# Patient Record
Sex: MALE | Race: BLACK OR AFRICAN AMERICAN | NOT HISPANIC OR LATINO | Employment: FULL TIME | URBAN - METROPOLITAN AREA
[De-identification: names, ages, dates, MRNs, and addresses within clinical notes are randomized per-mention and may not be internally consistent; named-entity substitution may affect disease eponyms.]

---

## 2017-02-08 ENCOUNTER — THERAPY VISIT (OUTPATIENT)
Dept: PHYSICAL THERAPY | Facility: CLINIC | Age: 37
End: 2017-02-08
Payer: COMMERCIAL

## 2017-02-08 DIAGNOSIS — M54.2 CERVICALGIA: Primary | ICD-10-CM

## 2017-02-08 PROCEDURE — 97140 MANUAL THERAPY 1/> REGIONS: CPT | Mod: GP | Performed by: PHYSICAL THERAPIST

## 2017-02-08 PROCEDURE — 97035 APP MDLTY 1+ULTRASOUND EA 15: CPT | Mod: GP | Performed by: PHYSICAL THERAPIST

## 2017-02-08 PROCEDURE — 97110 THERAPEUTIC EXERCISES: CPT | Mod: GP | Performed by: PHYSICAL THERAPIST

## 2017-02-08 NOTE — PROGRESS NOTES
Subjective:    HPI                    Objective:    System    Physical Exam    General     ROS    Assessment/Plan:      PROGRESS  REPORT    Progress reporting period is from 10/18/16 to 10/28/16 (4 visits), returned to PT today 2/8/17 to resume.       SUBJECTIVE  Subjective changes noted by patient:  Patient reports that he was feeling good after initial 4 visits of PT, but that symptoms returned as of late.  Still L sided neck near C7 and into L upper trap.  Denies shoulder or radicular L arm/UE pain.  He did see his MD and was referred back to PT.  He has continued some of his HEP and felt that US was helpful before, would like to have that again.     Current pain level is: 4/10.     Previous pain level was: 6/10.   Changes in function:  Will re establish goals  Adverse reaction to treatment or activity: had been doing well, but symptoms returned without specific activity.      OBJECTIVE  Changes noted in objective findings:  Yes,   CERVICAL:  Neurological:    Motor Deficit:  Myotomes L R   C4 (shoulder elevation) 5 5   C5 (shoulder abduction) 5 5   C6 (elbow flexion) 5 NT due to elbow deformity   C7 (elbow extension) 5    C8 (thumb extension) 5    T1 (finger add/abd) 5     Strength (lb) WNL      Sensory Deficit, Reflexes, Dural Signs: intact light touch screen L UE dermatome    AROM: (Major, Moderate, Minimal or Nil loss)  Movement Loss Ac Mod Min Nil Pain   Protrusion    X No effect   Flexion    X No effect   Retraction    X Trace pain posterior   Extension   X  No effect   Left Rotation   X  Pain L neck/upper trap   Right Rotation    X No effect   Left Side Bending    X No effect   Right Side bending    X Trace pain L neck/upper trap     Other Tests: palpation is with tightness/tenderness L lateral to C7 and in L upper trap.     ASSESSMENT/PLAN  Updated problem list and treatment plan: Diagnosis 1:  L sided neck pain    Pain -  hot/cold therapy, US, manual therapy and directional preference  exercise  Decreased ROM/flexibility - manual therapy and therapeutic exercise  Decreased strength - therapeutic exercise and therapeutic activities  Decreased proprioception - neuro re-education and therapeutic activities  Decreased function - therapeutic activities  Impaired posture - neuro re-education  STG/LTGs have been met or progress has been made towards goals:  Will re establish goals as per goal flow sheet.  Assessment of Progress: The patient's condition has potential to improve.  Self Management Plans:  Patient has been instructed in a home treatment program.  I have re-evaluated this patient and find that the nature, scope, duration and intensity of the therapy is appropriate for the medical condition of the patient.  Mi continues to require the following intervention to meet STG and LTG's:  PT    Recommendations:  This patient would benefit from continued therapy.     Frequency:  1 X week, once daily  Duration:  for 4 weeks        Please refer to the daily flowsheet for treatment today, total treatment time and time spent performing 1:1 timed codes.

## 2017-02-14 ENCOUNTER — THERAPY VISIT (OUTPATIENT)
Dept: PHYSICAL THERAPY | Facility: CLINIC | Age: 37
End: 2017-02-14
Payer: COMMERCIAL

## 2017-02-14 DIAGNOSIS — M54.2 CERVICALGIA: ICD-10-CM

## 2017-02-14 PROCEDURE — 97110 THERAPEUTIC EXERCISES: CPT | Mod: GP | Performed by: PHYSICAL THERAPIST

## 2017-02-14 PROCEDURE — 97140 MANUAL THERAPY 1/> REGIONS: CPT | Mod: GP | Performed by: PHYSICAL THERAPIST

## 2017-02-14 PROCEDURE — 97035 APP MDLTY 1+ULTRASOUND EA 15: CPT | Mod: GP | Performed by: PHYSICAL THERAPIST

## 2017-02-14 NOTE — MR AVS SNAPSHOT
"              After Visit Summary   2017    Mi Saldivar    MRN: [id number]           Patient Information     Date Of Birth          1980        Visit Information        Provider Department      2017 11:10 AM Estiven Fitzpatrick PT [facility] Physical Therapy        Today's Diagnoses     Cervicalgia           Follow-ups after your visit        Who to contact     If you have questions or need follow up information about today's clinic visit or your schedule please contact [facility] PHYSICAL [facility] directly at [phone].  Normal or non-critical lab and imaging results will be communicated to you by [employer], letter or phone within 4 business days after the clinic has received the results. If you do not hear from us within 7 days, please contact the clinic through ArcSightt or phone. If you have a critical or abnormal lab result, we will notify you by phone as soon as possible.  Submit refill requests through Myfacepage or call your pharmacy and they will forward the refill request to us. Please allow 3 business days for your refill to be completed.          Additional Information About Your Visit        MyChart Information     Myfacepage lets you send messages to your doctor, view your test results, renew your prescriptions, schedule appointments and more. To sign up, go to [email or web address] . Click on \"Log in\" on the left side of the screen, which will take you to the Welcome page. Then click on \"Sign up Now\" on the right side of the page.     You will be asked to enter the access code listed below, as well as some personal information. Please follow the directions to create your username and password.     Your access code is: [email or web address]  Expires: 5/15/2017 11:55 AM     Your access code will  in 90 days. If you need help or a new code, please call your [place] clinic or [phone].        Care EveryWhere ID     This is your " Care EveryWhere ID. This could be used by other organizations to access your [place] medical records  [id number]         Blood Pressure from Last 3 Encounters:   02/19/16 153/87    Weight from Last 3 Encounters:   02/19/16 90.7 kg (200 lb)              We Performed the Following     JAMES PROGRESS NOTES REPORT     MANUAL THER TECH,1+REGIONS,EA 15 MIN     THERAPEUTIC EXERCISES     ULTRASOUND THERAPY        Primary Care Provider Office Phone # Fax #    Zenon Pettit [phone]       [facility] [address] [address]        Thank you!     Thank you for choosing [facility] PHYSICAL THERAPY  for your care. Our goal is always to provide you with excellent care. Hearing back from our patients is one way we can continue to improve our services. Please take a few minutes to complete the written survey that you may receive in the mail after your visit with us. Thank you!             Your Updated Medication List - Protect others around you: Learn how to safely use, store and throw away your medicines at www.disposemymeds.org.          This list is accurate as of: 2/14/17 11:55 AM.  Always use your most recent med list.                   Brand Name Dispense Instructions for use    ciprofloxacin 500 MG tablet    CIPRO    28 tablet    Take 1 tablet (500 mg) by mouth 2 times daily       naproxen 500 MG tablet    NAPROSYN    60 tablet    Take 1 tablet (500 mg) by mouth 2 times daily as needed for moderate pain       OMEPRAZOLE PO          vitamin D 2000 UNITS tablet     100 tablet    Take 2,000 Units by mouth daily

## 2017-02-14 NOTE — PROGRESS NOTES
Subjective:    HPI                    Objective:    System    Physical Exam    General     ROS    Assessment/Plan:      PROGRESS  REPORT    Progress reporting period is from 10/18/16 to 2/14/17 (6 visits).       SUBJECTIVE  Subjective changes noted by patient:  Patient was in for 4 visits initially and was doing better on his own for a couple of months, he returned last week after seeing his MD.  He had some increased symptoms and had been ordered for 2 more PT visits.  He is now at 6/6 and plans to re check with his MD to see if he should continue PT or if there is another recommendation.  As of today, patient reports that he is feeling a little better.  He notes that pain level decreased to 3/10.  He notes that he's  if he touches the painful area in the L neck/upper trap.  He does feel that his exercises are helping and that US and manaul therapy helped in PT last visit.     Current pain level is: 3/10.     Previous pain level was: 6/10.   Changes in function:  Yes (See Goal flowsheet attached for changes in current functional level)  Adverse reaction to treatment or activity: None    OBJECTIVE  Changes noted in objective findings:  Yes,   Objective: Cervical AROM: min loss of extension and L rotation, both with pain L neck/upper trap area.  Remaining motions are not painful today.      (PN done on 2/8/17--can see that note for further recent details)     ASSESSMENT/PLAN  Updated problem list and treatment plan: Diagnosis 1:  Cervicalgia with pain L upper trap    Pain -  hot/cold therapy, US, manual therapy, directional preference exercise and home program  Decreased ROM/flexibility - manual therapy and therapeutic exercise  Decreased strength - therapeutic exercise and therapeutic activities  Decreased proprioception - neuro re-education and therapeutic activities  Decreased function - therapeutic activities  Impaired posture - neuro re-education  STG/LTGs have been met or progress has been made  towards goals:  Yes (See Goal flow sheet completed today.)  Assessment of Progress: The patient's condition has potential to improve.  Self Management Plans:  Patient has been instructed in a home treatment program.  I have re-evaluated this patient and find that the nature, scope, duration and intensity of the therapy is appropriate for the medical condition of the patient.  Mi continues to require the following intervention to meet STG and LTG's:  PT    Recommendations:  Will re check with his MD to see if there is any further recommendation for PT or other care.     Please refer to the daily flowsheet for treatment today, total treatment time and time spent performing 1:1 timed codes.

## 2017-02-14 NOTE — LETTER
[facility] ATHLETIC [facility] PHYSICAL THERAPY  [address]  [facility] [address]  [phone]    February 15, 2017    Re: Mi Saldivar   :   1980  MRN:  [id number]   REFERRING PHYSICIAN:   Rickie Rowland    [facility] ATHLETIC [facility] PHYSICAL THERAPY    Date of Initial Evaluation:  10/18/2016  Visits:  Rxs Used: 6  Reason for Referral:  Cervicalgia    PROGRESS  REPORT  Progress reporting period is from 10/18/16 to 17 (6 visits).       SUBJECTIVE  Subjective changes noted by patient:  Patient was in for 4 visits initially and was doing better on his own for a couple of months, he returned last week after seeing his MD.  He had some increased symptoms and had been ordered for 2 more PT visits.  He is now at 6/6 and plans to re check with his MD to see if he should continue PT or if there is another recommendation.  As of today, patient reports that he is feeling a little better.  He notes that pain level decreased to 3/10.  He notes that he's  if he touches the painful area in the L neck/upper trap.  He does feel that his exercises are helping and that US and manaul therapy helped in PT last visit.     Current pain level is: 3/10.     Previous pain level was: 6/10.   Changes in function:  Yes (See Goal flowsheet attached for changes in current functional level)  Adverse reaction to treatment or activity: None    OBJECTIVE  Changes noted in objective findings:  Yes,   Objective: Cervical AROM: min loss of extension and L rotation, both with pain L neck/upper trap area.  Remaining motions are not painful today.    (PN done on 17--can see that note for further recent details)     ASSESSMENT/PLAN  Updated problem list and treatment plan: Diagnosis 1:  Cervicalgia with pain L upper trap    Pain -  hot/cold therapy, US, manual therapy, directional preference exercise and home program  Decreased ROM/flexibility - manual therapy and  therapeutic exercise  Decreased strength - therapeutic exercise and therapeutic activities  Decreased proprioception - neuro re-education and therapeutic activities  Re: Mi Saldivar   :   1980    Decreased function - therapeutic activities  Impaired posture - neuro re-education  STG/LTGs have been met or progress has been made towards goals:  Yes (See Goal flow sheet completed today.)  Assessment of Progress: The patient's condition has potential to improve.  Self Management Plans:  Patient has been instructed in a home treatment program.  I have re-evaluated this patient and find that the nature, scope, duration and intensity of the therapy is appropriate for the medical condition of the patient.  Mi continues to require the following intervention to meet STG and LTG's:  PT    Recommendations:  Will re check with his MD to see if there is any further recommendation for PT or other care.     Thank you for your referral.    INQUIRIES  Therapist: Jose Fitzpatrick DPT  [facility]  [address] [address]  Phone: [phone]  Fax: [phone]

## 2017-02-16 ENCOUNTER — OFFICE VISIT (OUTPATIENT)
Dept: FAMILY MEDICINE | Facility: CLINIC | Age: 37
End: 2017-02-16

## 2017-02-16 VITALS
BODY MASS INDEX: 31.32 KG/M2 | TEMPERATURE: 98.2 F | DIASTOLIC BLOOD PRESSURE: 90 MMHG | SYSTOLIC BLOOD PRESSURE: 143 MMHG | RESPIRATION RATE: 16 BRPM | WEIGHT: 200 LBS | OXYGEN SATURATION: 97 % | HEART RATE: 68 BPM

## 2017-02-16 DIAGNOSIS — M72.2 PLANTAR FASCIITIS: Primary | ICD-10-CM

## 2017-02-16 DIAGNOSIS — A04.8 H. PYLORI INFECTION: ICD-10-CM

## 2017-02-16 DIAGNOSIS — R03.0 ELEVATED BLOOD PRESSURE READING WITHOUT DIAGNOSIS OF HYPERTENSION: ICD-10-CM

## 2017-02-16 LAB
BUN SERPL-MCNC: 7.5 MG/DL (ref 7–21)
CALCIUM SERPL-MCNC: 9.4 MG/DL (ref 8.5–10.1)
CHLORIDE SERPLBLD-SCNC: 101.7 MMOL/L (ref 98–110)
CHOLEST SERPL-MCNC: 186.3 MG/DL (ref 0–200)
CHOLEST/HDLC SERPL: 3.7 {RATIO} (ref 0–5)
CO2 SERPL-SCNC: 31.9 MMOL/L (ref 20–32)
CREAT SERPL-MCNC: 0.8 MG/DL (ref 0.7–1.3)
GFR SERPL CREATININE-BSD FRML MDRD: 114.4 ML/MIN/1.7 M2
GLUCOSE SERPL-MCNC: 113.7 MG'DL (ref 70–99)
HBA1C MFR BLD: 5.5 % (ref 4.1–5.7)
HDLC SERPL-MCNC: 50.4 MG/DL
LDLC SERPL CALC-MCNC: 120 MG/DL (ref 0–129)
POTASSIUM SERPL-SCNC: 4.2 MMOL/DL (ref 3.3–4.5)
SODIUM SERPL-SCNC: 139 MMOL/L (ref 132.6–141.4)
TRIGL SERPL-MCNC: 80 MG/DL (ref 0–150)
VLDL CHOLESTEROL: 16 MG/DL (ref 7–32)

## 2017-02-16 ASSESSMENT — ENCOUNTER SYMPTOMS
BACK PAIN: 0
CONSTIPATION: 1
APPETITE CHANGE: 1
DIARRHEA: 0
VOMITING: 0
BLOOD IN STOOL: 0
NAUSEA: 0
COUGH: 0
CHILLS: 0
UNEXPECTED WEIGHT CHANGE: 0
ARTHRALGIAS: 1
SHORTNESS OF BREATH: 0
ABDOMINAL PAIN: 1
NERVOUS/ANXIOUS: 1
FEVER: 0

## 2017-02-16 NOTE — PATIENT INSTRUCTIONS
Here is the plan from today's visit    1. Plantar fasciitis  Ice two times daily   Stretching at wall two times daily   Night towel and toe exercise   If not improving in 1-2 weeks return for more therapy    2. H. pylori infection  Check   - H Pylori antigen stool; Future    3. High blood pressure   Check lipids, BMP   Please follow up to recheck BP in 2-4 weeks      Please call or return to clinic if your symptoms don't go away.    Follow up plan  Please make a clinic appointment for follow up with me (LÁZARO YANEZ) in 2-4   weeks for BP recheck.    Thank you for coming to [facility] today.  Lab Testing:  **If you had lab testing today and your results are reassuring or normal they will be mailed to you or sent through Krossover within 7 days.   **If the lab tests need quick action we will call you with the results.  The phone number we will call with results is # [phone] (home) . If this is not the best number please call our clinic and change the number.  Medication Refills:  If you need any refills please call your pharmacy and they will contact us.   If you need to  your refill at a new pharmacy, please contact the new pharmacy directly. The new pharmacy will help you get your medications transferred faster.   Scheduling:  If you have any concerns about today's visit or wish to schedule another appointment please call our office during normal business hours [phone] (8-5:00 M-F)  If a referral was made to a [facility] and you don't get a call from central scheduling please call [phone].  If a Mammogram was ordered for you at [facility] call [phone] to schedule or change your appointment.  If you had an XRay/CT/Ultrasound/MRI ordered the number is [phone] to schedule or change your radiology appointment.   Medical Concerns:  If you have urgent medical concerns please call [phone] at any time of the day.

## 2017-02-16 NOTE — MR AVS SNAPSHOT
After Visit Summary   2/16/2017    Mi Saldivar    MRN: [id number]           Patient Information     Date Of Birth          1980        Visit Information        Provider Department      2/16/2017 10:00 AM Zenon Pettit MD [facility]        Today's Diagnoses     Plantar fasciitis    -  1    H. pylori infection        Elevated blood pressure reading without diagnosis of hypertension          Care Instructions    Here is the plan from today's visit    1. Plantar fasciitis  Ice two times daily   Stretching at wall two times daily   Night towel and toe exercise   If not improving in 1-2 weeks return for more therapy    2. H. pylori infection  Check   - H Pylori antigen stool; Future    3. High blood pressure   Check lipids, BMP   Please follow up to recheck BP in 2-4 weeks      Please call or return to clinic if your symptoms don't go away.    Follow up plan  Please make a clinic appointment for follow up with me (ZENON PETTIT) in 2-4   weeks for BP recheck.    Thank you for coming to [facility] today.  Lab Testing:  **If you had lab testing today and your results are reassuring or normal they will be mailed to you or sent through Kleek within 7 days.   **If the lab tests need quick action we will call you with the results.  The phone number we will call with results is # [phone] (home) . If this is not the best number please call our clinic and change the number.  Medication Refills:  If you need any refills please call your pharmacy and they will contact us.   If you need to  your refill at a new pharmacy, please contact the new pharmacy directly. The new pharmacy will help you get your medications transferred faster.   Scheduling:  If you have any concerns about today's visit or wish to schedule another appointment please call our office during normal business hours [phone] (8-5:00 M-F)  If a referral was made to a [facility] and  you don't get a call from central scheduling please call [phone].  If a Mammogram was ordered for you at [facility] call [phone] to schedule or change your appointment.  If you had an XRay/CT/Ultrasound/MRI ordered the number is [phone] to schedule or change your radiology appointment.   Medical Concerns:  If you have urgent medical concerns please call [phone] at any time of the day.          Follow-ups after your visit        Future tests that were ordered for you today     Open Future Orders        Priority Expected Expires Ordered    H Pylori antigen stool Routine  3/18/2017 2017            Who to contact     Please call your clinic at [phone] to:    Ask questions about your health    Make or cancel appointments    Discuss your medicines    Learn about your test results    Speak to your doctor   If you have compliments or concerns about an experience at your clinic, or if you wish to file a complaint, please contact [facility] Patient Relations at [phone] or email us at [email or web address]         Additional Information About Your Visit        [employer] App Information     i'mma is an electronic gateway that provides easy, online access to your medical records. With i'mma, you can request a clinic appointment, read your test results, renew a prescription or communicate with your care team.     To sign up for i'mma visit the website at [email or web address]   You will be asked to enter the access code listed below, as well as some personal information. Please follow the directions to create your username and password.     Your access code is: [email or web address]  Expires: 5/15/2017 11:55 AM     Your access code will  in 90 days. If you need help or a new code, please contact your [facility] or call [phone] for assistance.        Care EveryWhere ID     This is your Care EveryWhere ID. This  could be used by other organizations to access your [place] medical records  [id number]        Your Vitals Were     Pulse Temperature Respirations Pulse Oximetry BMI (Body Mass Index)       68 98.2  F (36.8  C) (Oral) 16 97% 31.32 kg/m2        Blood Pressure from Last 3 Encounters:   02/16/17 143/90   02/19/16 153/87    Weight from Last 3 Encounters:   02/16/17 200 lb (90.7 kg)   02/19/16 200 lb (90.7 kg)              We Performed the Following     Basic Metabolic Panel ([facility])     Hemoglobin A1c ([facility])     Lipid [place] ([facility])          Today's Medication Changes          These changes are accurate as of: 2/16/17 10:43 AM.  If you have any questions, ask your nurse or doctor.               Stop taking these medicines if you haven't already. Please contact your care team if you have questions.     ciprofloxacin 500 MG tablet   Commonly known as:  CIPRO   Stopped by:  Zenon Pettit MD           naproxen 500 MG tablet   Commonly known as:  NAPROSYN   Stopped by:  Zenon Pettit MD           OMEPRAZOLE PO   Stopped by:  Zenon Pettit MD                    Primary Care Provider Office Phone # Fax #    Zenon Pettit [phone]       [facility] [address] [address]        Thank you!     Thank you for choosing \[facility]\"" FAMILY [facility]IC[facility] CLINIC  for your care. Our goal is always to provide you with excellent care. Hearing back from our patients is one way we can continue to improve our services. Please take a few minutes to complete the written survey that you may receive in the mail after your visit with us. Thank you!             Your Updated Medication List - Protect others around you: Learn how to safely use, store and throw away your medicines at www.disposemymeds.org.          This list is accurate as of: 2/16/17 10:43 AM.  Always use your most recent med list.                   Brand Name Dispense Instructions for use    vitamin D 2000 UNITS tablet      100 tablet    Take 2,000 Units by mouth daily

## 2017-02-16 NOTE — LETTER
February 16, 2017      Starkemani Saldivar  [address]  [facility] [place] [facility] [address]        Dear Stark,    Thank you for getting your care at [facility]. Please see below for your test results.  These results are normal.    Resulted Orders   Basic Metabolic Panel ([facility])   Result Value Ref Range    Urea Nitrogen 7.5 7.0 - 21.0 mg/dL    Calcium 9.4 8.5 - 10.1 mg/dL    Chloride 101.7 98.0 - 110.0 mmol/L    Carbon Dioxide 31.9 20.0 - 32.0 mmol/L    Creatinine 0.8 0.7 - 1.3 mg/dL    Glucose 113.7 (H) 70.0 - 99.0 mg'dL    Potassium 4.2 3.3 - 4.5 mmol/dL    Sodium 139.0 132.6 - 141.4 mmol/L    GFR Estimate 114.4 mL/min/1.7 m2    GFR Estimate If Black 138.4 mL/min/1.7 m2   Lipid [place] ([facility])   Result Value Ref Range    Cholesterol 186.3 0.0 - 200.0 mg/dL    Cholesterol/HDL Ratio 3.7 0.0 - 5.0    HDL Cholesterol 50.4 >40.0 mg/dL    LDL Cholesterol Calculated 120 0 - 129 mg/dL    Triglycerides 80.0 0.0 - 150.0 mg/dL    VLDL Cholesterol 16.0 7.0 - 32.0 mg/dL   Hemoglobin A1c ([facility])   Result Value Ref Range    Hemoglobin A1C 5.5 4.1 - 5.7 %       If you have any concerns about these results please call and leave a message for me or send a Skimo TVt message to the clinic.    Sincerely,    Zenon Pettit MD

## 2017-02-17 NOTE — PROGRESS NOTES
Letter with results sent to patient on 2/16/2017  See letters section for details. Zenon Pettit MD

## 2024-01-14 ENCOUNTER — ANCILLARY PROCEDURE (OUTPATIENT)
Dept: GENERAL RADIOLOGY | Facility: CLINIC | Age: 44
End: 2024-01-14
Payer: COMMERCIAL

## 2024-01-14 ENCOUNTER — OFFICE VISIT (OUTPATIENT)
Dept: URGENT CARE | Facility: URGENT CARE | Age: 44
End: 2024-01-14
Payer: COMMERCIAL

## 2024-01-14 VITALS
OXYGEN SATURATION: 97 % | RESPIRATION RATE: 18 BRPM | TEMPERATURE: 99.5 F | DIASTOLIC BLOOD PRESSURE: 78 MMHG | HEART RATE: 89 BPM | SYSTOLIC BLOOD PRESSURE: 130 MMHG

## 2024-01-14 DIAGNOSIS — J18.9 ATYPICAL PNEUMONIA: Primary | ICD-10-CM

## 2024-01-14 DIAGNOSIS — R05.1 ACUTE COUGH: ICD-10-CM

## 2024-01-14 LAB
BASOPHILS # BLD AUTO: 0 10E3/UL (ref 0–0.2)
BASOPHILS NFR BLD AUTO: 0 %
EOSINOPHIL # BLD AUTO: 0 10E3/UL (ref 0–0.7)
EOSINOPHIL NFR BLD AUTO: 1 %
ERYTHROCYTE [DISTWIDTH] IN BLOOD BY AUTOMATED COUNT: 12 % (ref 10–15)
FLUAV AG SPEC QL IA: NEGATIVE
FLUBV AG SPEC QL IA: NEGATIVE
HCT VFR BLD AUTO: 41 % (ref 40–53)
HGB BLD-MCNC: 13.8 G/DL (ref 13.3–17.7)
IMM GRANULOCYTES # BLD: 0 10E3/UL
IMM GRANULOCYTES NFR BLD: 0 %
LYMPHOCYTES # BLD AUTO: 1.7 10E3/UL (ref 0.8–5.3)
LYMPHOCYTES NFR BLD AUTO: 25 %
MCH RBC QN AUTO: 29.8 PG (ref 26.5–33)
MCHC RBC AUTO-ENTMCNC: 33.7 G/DL (ref 31.5–36.5)
MCV RBC AUTO: 89 FL (ref 78–100)
MONOCYTES # BLD AUTO: 0.7 10E3/UL (ref 0–1.3)
MONOCYTES NFR BLD AUTO: 10 %
NEUTROPHILS # BLD AUTO: 4.4 10E3/UL (ref 1.6–8.3)
NEUTROPHILS NFR BLD AUTO: 64 %
PLATELET # BLD AUTO: 245 10E3/UL (ref 150–450)
RBC # BLD AUTO: 4.63 10E6/UL (ref 4.4–5.9)
WBC # BLD AUTO: 6.9 10E3/UL (ref 4–11)

## 2024-01-14 PROCEDURE — 87635 SARS-COV-2 COVID-19 AMP PRB: CPT

## 2024-01-14 PROCEDURE — 36415 COLL VENOUS BLD VENIPUNCTURE: CPT

## 2024-01-14 PROCEDURE — 71046 X-RAY EXAM CHEST 2 VIEWS: CPT | Mod: TC | Performed by: RADIOLOGY

## 2024-01-14 PROCEDURE — 99204 OFFICE O/P NEW MOD 45 MIN: CPT

## 2024-01-14 PROCEDURE — 85025 COMPLETE CBC W/AUTO DIFF WBC: CPT

## 2024-01-14 PROCEDURE — 87804 INFLUENZA ASSAY W/OPTIC: CPT

## 2024-01-14 RX ORDER — AZITHROMYCIN 250 MG/1
TABLET, FILM COATED ORAL
Qty: 6 TABLET | Refills: 0 | Status: SHIPPED | OUTPATIENT
Start: 2024-01-14 | End: 2024-01-19

## 2024-01-14 RX ORDER — GLYCERIN ADULT
SUPPOSITORY, RECTAL RECTAL SEE ADMIN INSTRUCTIONS
COMMUNITY
Start: 2023-11-21

## 2024-01-14 NOTE — LETTER
[facility] URGENT CARE [facility]  [address] [address]  Phone: [phone]    January 14, 2024        Mi Saldivar  [address] [facility] [address]          To whom it may concern:    RE: Mi Saldivar    Patient was seen and treated today at our clinic and missed work.He may return to work on 1/17/24.    Please contact me for questions or concerns.      Sincerely,      Clifford Lemon DO

## 2024-01-14 NOTE — PROGRESS NOTES
Assessment & Plan     Atypical pneumonia  44-year-old male presenting today with cough, fevers, headache for 6 days duration.  Vital signs stable although borderline tachycardic.  Exam reveals diffuse rhonchi with productive cough.  Given the findings I ordered CBC and chest x-ray which were both unremarkable.  Influenza was also negative, COVID pending.  Given duration of symptoms and my exam findings we will treat him for atypical pneumonia with azithromycin and recommended otherwise supportive cares.  Work note provided.  - Influenza A & B Antigen - Clinic Collect  - Symptomatic COVID-19 Virus (Coronavirus) by PCR Nose  - CBC with platelets and differential; Future  - XR Chest 2 Views; Future  - CBC with platelets and differential  - azithromycin (ZITHROMAX) 250 MG tablet; Take 2 tablets (500 mg) by mouth daily for 1 day, THEN 1 tablet (250 mg) daily for 4 days.    Return if symptoms worsen or fail to improve.    Clifford Lemon DO  [facility] Stark is a 44 year old, presenting for the following health issues:  URI (Cough, headache, eye pressure, ear pain, congestion in chest X 1 week )    HPI     Acute Illness  Acute illness concerns: Cough, Headache  Onset/Duration: 6 days  Symptoms:  Fever: YES  Chills/Sweats: YES  Headache (location?): YES  Sinus Pressure: YES  Conjunctivitis:  No  Ear Pain: YES- Bilateral  Rhinorrhea: YES  Congestion: YES  Sore Throat: YES  Cough: YES  Wheeze: No  Decreased Appetite: YES  Nausea: No  Vomiting: No  Diarrhea: No  Dysuria/Freq.: No  Dysuria or Hematuria: No  Fatigue/Achiness: YES  Sick/Strep Exposure: Unsure  Therapies tried and outcome: Tylenol and ibuprofen.     Review of Systems   Constitutional, HEENT, cardiovascular, pulmonary, gi and gu systems are negative, except as otherwise noted.      Objective    /78   Pulse 89   Temp 99.5  F (37.5  C) (Tympanic)   Resp 18   SpO2 97%   There is no height or weight on file to  calculate BMI.  Physical Exam   GENERAL: alert and no distress  NECK: no adenopathy, no asymmetry, masses, or scars and thyroid normal to palpation  RESP: Diffuse rhonchi throughout all lung fields, no wheezing  CV: regular rate and rhythm, normal S1 S2, no S3 or S4, no murmur, click or rub, no peripheral edema and peripheral pulses strong  ABDOMEN: soft, nontender, no hepatosplenomegaly, no masses and bowel sounds normal  MS: no gross musculoskeletal defects noted, no edema    Results for orders placed or performed in visit on 01/14/24 (from the past 24 hour(s))   Influenza A & B Antigen - Clinic Collect    Specimen: Nose; Swab   Result Value Ref Range    Influenza A antigen Negative Negative    Influenza B antigen Negative Negative    Narrative    Test results must be correlated with clinical data. If necessary, results should be confirmed by a molecular assay or viral culture.   CBC with platelets and differential    Narrative    The following orders were created for panel order CBC with platelets and differential.  Procedure                               Abnormality         Status                     ---------                               -----------         ------                     CBC with platelets and d...[[id number]]                      Final result                 Please view results for these tests on the individual orders.   CBC with platelets and differential   Result Value Ref Range    WBC Count 6.9 4.0 - 11.0 10e3/uL    RBC Count 4.63 4.40 - 5.90 10e6/uL    Hemoglobin 13.8 13.3 - 17.7 g/dL    Hematocrit 41.0 40.0 - 53.0 %    MCV 89 78 - 100 fL    MCH 29.8 26.5 - 33.0 pg    MCHC 33.7 31.5 - 36.5 g/dL    RDW 12.0 10.0 - 15.0 %    Platelet Count 245 150 - 450 10e3/uL    % Neutrophils 64 %    % Lymphocytes 25 %    % Monocytes 10 %    % Eosinophils 1 %    % Basophils 0 %    % Immature Granulocytes 0 %    Absolute Neutrophils 4.4 1.6 - 8.3 10e3/uL    Absolute Lymphocytes 1.7 0.8 - 5.3 10e3/uL    Absolute  Monocytes 0.7 0.0 - 1.3 10e3/uL    Absolute Eosinophils 0.0 0.0 - 0.7 10e3/uL    Absolute Basophils 0.0 0.0 - 0.2 10e3/uL    Absolute Immature Granulocytes 0.0 <=0.4 10e3/uL

## 2024-01-15 LAB — SARS-COV-2 RNA RESP QL NAA+PROBE: NEGATIVE
